# Patient Record
Sex: FEMALE | Race: WHITE | NOT HISPANIC OR LATINO | Employment: OTHER | URBAN - METROPOLITAN AREA
[De-identification: names, ages, dates, MRNs, and addresses within clinical notes are randomized per-mention and may not be internally consistent; named-entity substitution may affect disease eponyms.]

---

## 2017-07-24 ENCOUNTER — TRANSCRIBE ORDERS (OUTPATIENT)
Dept: ADMINISTRATIVE | Facility: HOSPITAL | Age: 67
End: 2017-07-24

## 2017-07-24 DIAGNOSIS — Z12.31 ENCOUNTER FOR MAMMOGRAM TO ESTABLISH BASELINE MAMMOGRAM: Primary | ICD-10-CM

## 2017-11-21 ENCOUNTER — TRANSCRIBE ORDERS (OUTPATIENT)
Dept: ADMINISTRATIVE | Facility: HOSPITAL | Age: 67
End: 2017-11-21

## 2017-11-21 DIAGNOSIS — M85.80 OSTEOPENIA, UNSPECIFIED LOCATION: Primary | ICD-10-CM

## 2017-11-27 ENCOUNTER — HOSPITAL ENCOUNTER (OUTPATIENT)
Dept: MAMMOGRAPHY | Facility: MEDICAL CENTER | Age: 67
Discharge: HOME | End: 2017-11-27
Payer: MEDICARE

## 2017-11-27 DIAGNOSIS — Z12.31 VISIT FOR SCREENING MAMMOGRAM: ICD-10-CM

## 2017-11-27 DIAGNOSIS — Z12.31 ENCOUNTER FOR MAMMOGRAM TO ESTABLISH BASELINE MAMMOGRAM: ICD-10-CM

## 2017-11-27 PROCEDURE — G0202 SCR MAMMO BI INCL CAD: HCPCS

## 2017-11-28 ENCOUNTER — GENERIC CONVERSION - ENCOUNTER (OUTPATIENT)
Dept: OTHER | Facility: OTHER | Age: 67
End: 2017-11-28

## 2017-11-28 ENCOUNTER — HOSPITAL ENCOUNTER (OUTPATIENT)
Dept: BONE DENSITY | Facility: MEDICAL CENTER | Age: 67
Discharge: HOME | End: 2017-11-28
Payer: MEDICARE

## 2017-11-28 DIAGNOSIS — M89.9 DISORDER OF BONE AND CARTILAGE: ICD-10-CM

## 2017-11-28 DIAGNOSIS — M94.9 DISORDER OF BONE AND CARTILAGE: ICD-10-CM

## 2017-11-28 DIAGNOSIS — M85.80 OSTEOPENIA, UNSPECIFIED LOCATION: ICD-10-CM

## 2017-11-28 PROCEDURE — 77080 DXA BONE DENSITY AXIAL: CPT

## 2017-12-13 ENCOUNTER — OFFICE VISIT (OUTPATIENT)
Dept: URGENT CARE | Facility: MEDICAL CENTER | Age: 67
End: 2017-12-13
Payer: MEDICARE

## 2017-12-13 PROCEDURE — G0463 HOSPITAL OUTPT CLINIC VISIT: HCPCS

## 2017-12-13 PROCEDURE — 99213 OFFICE O/P EST LOW 20 MIN: CPT

## 2017-12-15 NOTE — PROGRESS NOTES
Assessment  1  Acute bronchitis (466 0) (J20 9)    Plan  Acute bronchitis    · Amoxicillin-Pot Clavulanate 875-125 MG Oral Tablet; TAKE 1 TABLET EVERY 12HOURS DAILY   · PredniSONE 50 MG Oral Tablet; TAKE 1 TABLET DAILY   · Seek Immediate Medical Attention if: ; Status:Complete;   Done: [id number]   · Seek Immediate Medical Attention if: You are feeling short of breath ; Status:Complete;  Done: [id number]   · Seek Immediate Medical Attention if: You have difficulty breathing, or you are short ofbreath more often ; Status:Complete;   Done: 43PBI0164   · Seek Immediate Medical Attention if: You notice that breathing is rapid, more than 40times a minute ; Status:Complete;   Done: [id number]   · Follow Up if Not Better Evaluation and Treatment  Follow-up  Status: Complete  Done:[id number]    Discussion/Summary  Discussion Summary:   Increase fluids and rest  Warm saltwater gargles, hot tea with honey and lemon, throat lozenges, Chloraseptic spray as needed for sore throat relief  Tylenol and Advil as needed for fever and chills  Monitor for severe worsening of current symptoms, difficulty breathing, swallowing, uncontrollable fever or chills  With these symptoms follow up with PCP or ER immediately  Otherwise follow-up with PCP in 3-5 days if symptoms are not improving  Follow Up Instructions: Follow Up with your Primary Care Provider in 3-5 days  If your symptoms worsen, go to the nearest [place] [address] Emergency Department  Chief Complaint  1  Cough  Chief Complaint Free Text Note Form: Patient stating of a productive cough that started 6 days ago along with sinus congestion  She reports expectorating green/ brown sputum  History of Present Illness  HPI: 25-year-old female presents for evaluation of a productive cough x5 days  Patient denies any fevers, chills, chest pain, shortness of breath, difficulty breathing  She has no history of asthma or COPD   Patient is currently taking 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  She states she has taken steroids since with no problems  She states that she has been getting some chest congestion with a cough  Hospital Based Practices Required Assessment:  Pain Assessment  the patient states they do not have pain  Abuse And Domestic Violence Screen   Yes, the patient is safe at home  -- The patient states no one is hurting them  Depression And Suicide Screen  No, the patient has not had thoughts of hurting themself  No, the patient has not felt depressed in the past 7 days  Prefered Language is  [place]  Primary Language is  English  Review of Systems  Focused-Female:  Constitutional: no fever-- and-- no chills  Cardiovascular: no chest pain-- and-- no palpitations  Respiratory: cough, but-- no shortness of breath-- and-- no wheezing  Gastrointestinal: no abdominal pain,-- no nausea,-- no vomiting-- and-- no diarrhea  Neurological: no headache-- and-- no confusion  ROS Reviewed:   ROS reviewed  Active Problems  1  History of allergy (V15 09) (Z88 9)    Past Medical History  1  History of upper respiratory infection (V12 09) (Z87 09)  Active Problems And Past Medical History Reviewed: The active problems and past medical history were reviewed and updated today  Family History  Family History Reviewed: The family history was reviewed and updated today  Social History   · Being A Social Drinker   · Denied: History of Drug Use   · Never A Smoker  Social History Reviewed: The social history was reviewed and updated today  The social history was reviewed and is unchanged  Surgical History  Surgical History Reviewed: The surgical history was reviewed and updated today  Current Meds   1  Acyclovir CAPS; Therapy: (Recorded:72Kwd7513) to Recorded   2  Baby Aspirin 81 MG CHEW; Therapy: (Recorded:43Csd0325) to Recorded   3  Fish Oil CAPS; Therapy: (Recorded:84Ubr9719) to Recorded   4   Restasis 0 05 % Ophthalmic Emulsion; Therapy: ([id number]:[id number]) to Recorded   5  Vitamin B12 100 MCG Oral Tablet; Therapy: (Recorded:10Ljz0698) to Recorded   6  Vitamin C TABS; Therapy: (Recorded:65Sdo6575) to Recorded   7  Vitamin D3 2000 UNIT Oral Tablet; Therapy: (Recorded:70Mgk2294) to Recorded   8  Zoloft TABS; Therapy: (KFQVRIWV:18CVK1527) to Recorded  Medication List Reviewed: The medication list was reviewed and updated today  Allergies  1  Bacitracin SOLR    Vitals  Signs   Recorded: 46Exh0888 10:26AM   Temperature: 97 2 F, Tympanic  Heart Rate: 79  Respiration: 16  Systolic: 504, LUE, Sitting  Diastolic: 82, LUE, Sitting  Height: 5 ft 4 5 in  Weight: 137 lb   BMI Calculated: 23 15  BSA Calculated: 1 67  O2 Saturation: 100  Pain Scale: 0    Physical Exam   Constitutional  General appearance: No acute distress, well appearing and well nourished  Eyes  Conjunctiva and lids: No swelling, erythema or discharge  Pupils and irises: Equal, round and reactive to light  Ears, Nose, Mouth, and Throat  External inspection of ears and nose: Normal    Otoscopic examination: Tympanic membranes translucent with normal light reflex  Canals patent without erythema  Nasal mucosa, septum, and turbinates: Abnormal  -- Bilateral nasal congestion  Oropharynx: Normal with no erythema, edema, exudate or lesions  Pulmonary  Respiratory effort: No increased work of breathing or signs of respiratory distress  Auscultation of lungs: Abnormal  -- Inspiratory wheezing at the apices of both lungs bilaterally  Cardiovascular  Palpation of heart: Normal PMI, no thrills  Auscultation of heart: Normal rate and rhythm, normal S1 and S2, without murmurs     Psychiatric  Orientation to person, place, and time: Normal    Mood and affect: Normal        Signatures   Electronically signed by : Willi Morton [facility]; Dec 13 2017 11:07AM EST                       (Author)    Electronically signed by : MERI Ryder ; Dec 14 2017  3:18PM EST

## 2018-01-23 VITALS
SYSTOLIC BLOOD PRESSURE: 130 MMHG | OXYGEN SATURATION: 100 % | HEART RATE: 79 BPM | HEIGHT: 65 IN | WEIGHT: 137 LBS | RESPIRATION RATE: 16 BRPM | BODY MASS INDEX: 22.82 KG/M2 | DIASTOLIC BLOOD PRESSURE: 82 MMHG | TEMPERATURE: 97.2 F

## 2018-11-29 ENCOUNTER — TRANSCRIBE ORDERS (OUTPATIENT)
Dept: ADMINISTRATIVE | Facility: HOSPITAL | Age: 68
End: 2018-11-29

## 2018-11-29 DIAGNOSIS — Z12.39 SCREENING BREAST EXAMINATION: Primary | ICD-10-CM

## 2018-12-04 ENCOUNTER — HOSPITAL ENCOUNTER (OUTPATIENT)
Dept: MAMMOGRAPHY | Facility: MEDICAL CENTER | Age: 68
Discharge: HOME | End: 2018-12-04
Payer: MEDICARE

## 2018-12-04 VITALS — HEIGHT: 64 IN | BODY MASS INDEX: 22.88 KG/M2 | WEIGHT: 134 LBS

## 2018-12-04 DIAGNOSIS — Z12.39 SCREENING BREAST EXAMINATION: ICD-10-CM

## 2018-12-04 PROCEDURE — 77067 SCR MAMMO BI INCL CAD: CPT

## 2019-12-06 ENCOUNTER — TRANSCRIBE ORDERS (OUTPATIENT)
Dept: ADMINISTRATIVE | Facility: HOSPITAL | Age: 69
End: 2019-12-06

## 2019-12-06 DIAGNOSIS — Z78.0 POSTMENOPAUSAL STATUS: ICD-10-CM

## 2019-12-06 DIAGNOSIS — Z12.31 ENCOUNTER FOR SCREENING MAMMOGRAM FOR MALIGNANT NEOPLASM OF BREAST: Primary | ICD-10-CM

## 2020-05-27 ENCOUNTER — HOSPITAL ENCOUNTER (OUTPATIENT)
Dept: MAMMOGRAPHY | Facility: MEDICAL CENTER | Age: 70
Discharge: HOME | End: 2020-05-27
Payer: MEDICARE

## 2020-05-27 ENCOUNTER — HOSPITAL ENCOUNTER (OUTPATIENT)
Dept: BONE DENSITY | Facility: MEDICAL CENTER | Age: 70
Discharge: HOME | End: 2020-05-27
Payer: MEDICARE

## 2020-05-27 VITALS — HEIGHT: 63 IN | WEIGHT: 133 LBS | BODY MASS INDEX: 23.57 KG/M2

## 2020-05-27 DIAGNOSIS — Z12.31 ENCOUNTER FOR SCREENING MAMMOGRAM FOR MALIGNANT NEOPLASM OF BREAST: ICD-10-CM

## 2020-05-27 DIAGNOSIS — Z78.0 POSTMENOPAUSAL STATUS: ICD-10-CM

## 2020-05-27 PROCEDURE — 77063 BREAST TOMOSYNTHESIS BI: CPT

## 2020-05-27 PROCEDURE — 77067 SCR MAMMO BI INCL CAD: CPT

## 2020-05-27 PROCEDURE — 77080 DXA BONE DENSITY AXIAL: CPT

## 2021-12-16 ENCOUNTER — HOSPITAL ENCOUNTER (OUTPATIENT)
Dept: MAMMOGRAPHY | Facility: MEDICAL CENTER | Age: 71
Discharge: HOME | End: 2021-12-16
Payer: MEDICARE

## 2021-12-16 VITALS — HEIGHT: 63 IN | BODY MASS INDEX: 23.55 KG/M2 | WEIGHT: 132.94 LBS

## 2021-12-16 DIAGNOSIS — Z12.31 ENCOUNTER FOR SCREENING MAMMOGRAM FOR MALIGNANT NEOPLASM OF BREAST: ICD-10-CM

## 2021-12-16 PROCEDURE — 77063 BREAST TOMOSYNTHESIS BI: CPT

## 2021-12-16 PROCEDURE — 77067 SCR MAMMO BI INCL CAD: CPT
